# Patient Record
Sex: MALE | URBAN - METROPOLITAN AREA
[De-identification: names, ages, dates, MRNs, and addresses within clinical notes are randomized per-mention and may not be internally consistent; named-entity substitution may affect disease eponyms.]

---

## 2019-11-12 ENCOUNTER — APPOINTMENT (RX ONLY)
Dept: URBAN - METROPOLITAN AREA CLINIC 106 | Facility: CLINIC | Age: 69
Setting detail: DERMATOLOGY
End: 2019-11-12

## 2019-11-12 DIAGNOSIS — L30.9 DERMATITIS, UNSPECIFIED: ICD-10-CM

## 2019-11-12 DIAGNOSIS — L30.4 ERYTHEMA INTERTRIGO: ICD-10-CM

## 2019-11-12 DIAGNOSIS — L57.8 OTHER SKIN CHANGES DUE TO CHRONIC EXPOSURE TO NONIONIZING RADIATION: ICD-10-CM

## 2019-11-12 DIAGNOSIS — L23.9 ALLERGIC CONTACT DERMATITIS, UNSPECIFIED CAUSE: ICD-10-CM

## 2019-11-12 PROBLEM — K75.9 INFLAMMATORY LIVER DISEASE, UNSPECIFIED: Status: ACTIVE | Noted: 2019-11-12

## 2019-11-12 PROBLEM — E13.9 OTHER SPECIFIED DIABETES MELLITUS WITHOUT COMPLICATIONS: Status: ACTIVE | Noted: 2019-11-12

## 2019-11-12 PROCEDURE — ? COUNSELING

## 2019-11-12 PROCEDURE — ? DIAGNOSIS COMMENT

## 2019-11-12 PROCEDURE — 99213 OFFICE O/P EST LOW 20 MIN: CPT

## 2019-11-12 PROCEDURE — ? ADDITIONAL NOTES

## 2019-11-12 PROCEDURE — ? TREATMENT REGIMEN

## 2019-11-12 PROCEDURE — ? PRESCRIPTION

## 2019-11-12 RX ORDER — CLINDAMYCIN 1 G/10ML
GEL TOPICAL
Qty: 1 | Refills: 2 | Status: ERX | COMMUNITY
Start: 2019-11-12

## 2019-11-12 RX ORDER — PIMECROLIMUS 10 MG/G
CREAM TOPICAL
Qty: 1 | Refills: 1 | Status: ERX | COMMUNITY
Start: 2019-11-12

## 2019-11-12 RX ORDER — NYSTATIN AND TRIAMCINOLONE ACETONIDE 100000; 1 [USP'U]/G; MG/G
CREAM TOPICAL DAILY
Qty: 1 | Refills: 2 | Status: ERX | COMMUNITY
Start: 2019-11-12

## 2019-11-12 RX ADMIN — NYSTATIN AND TRIAMCINOLONE ACETONIDE: 100000; 1 CREAM TOPICAL at 20:21

## 2019-11-12 RX ADMIN — PIMECROLIMUS: 10 CREAM TOPICAL at 20:11

## 2019-11-12 RX ADMIN — CLINDAMYCIN: 1 GEL TOPICAL at 20:17

## 2019-11-12 ASSESSMENT — LOCATION DETAILED DESCRIPTION DERM
LOCATION DETAILED: LEFT SUPERIOR PARIETAL SCALP
LOCATION DETAILED: RIGHT DORSAL FOOT
LOCATION DETAILED: LEFT CENTRAL MALAR CHEEK
LOCATION DETAILED: SUPRAPUBIC SKIN
LOCATION DETAILED: LEFT DORSAL FOOT

## 2019-11-12 ASSESSMENT — LOCATION SIMPLE DESCRIPTION DERM
LOCATION SIMPLE: LEFT CHEEK
LOCATION SIMPLE: LEFT FOOT
LOCATION SIMPLE: SCALP
LOCATION SIMPLE: RIGHT FOOT
LOCATION SIMPLE: GROIN

## 2019-11-12 ASSESSMENT — LOCATION ZONE DERM
LOCATION ZONE: SCALP
LOCATION ZONE: FACE
LOCATION ZONE: FEET
LOCATION ZONE: TRUNK

## 2019-11-12 NOTE — PROCEDURE: DIAGNOSIS COMMENT
Detail Level: Simple
Comment: Secondary to CPAP machine.
Comment: Pt suspects rash secondary to CPAP mask - wears qhs

## 2019-11-12 NOTE — PROCEDURE: TREATMENT REGIMEN
Initiate Treatment: Elidel 1 % topical cream \\nDays Supply: 30\\nSig: Apply to affected areas on face twice daily.
Detail Level: Zone
Plan: Reuse 5FU/ calcipotriene mix
Initiate Treatment: nystatin-triamcinolone 100,000 unit/g-0.1 % topical cream Daily\\nDays Supply: 30\\nSig: Apply to rash in groin region once to twice daily as needed
Initiate Treatment: Clindagel 1 % topical gel, once daily \\nDays Supply: 30\\nSig: Apply to affected areas on feet daily after shower, prn rash

## 2019-11-12 NOTE — PROCEDURE: MIPS QUALITY
Quality 110: Preventive Care And Screening: Influenza Immunization: Influenza immunization was not ordered or administered, reason not given
Quality 226: Preventive Care And Screening: Tobacco Use: Screening And Cessation Intervention: Patient screened for tobacco use and is an ex/non-smoker
Detail Level: Detailed
Quality 131: Pain Assessment And Follow-Up: Pain assessment using a standardized tool is documented as negative, no follow-up plan required
Quality 111:Pneumonia Vaccination Status For Older Adults: Pneumococcal Vaccination Administered
Quality 130: Documentation Of Current Medications In The Medical Record: Current Medications Documented

## 2020-02-04 ENCOUNTER — RX ONLY (OUTPATIENT)
Age: 70
Setting detail: RX ONLY
End: 2020-02-04

## 2020-02-04 RX ORDER — PIMECROLIMUS 10 MG/G
CREAM TOPICAL
Qty: 3 | Refills: 2 | Status: ERX

## 2020-11-10 ENCOUNTER — APPOINTMENT (RX ONLY)
Dept: URBAN - METROPOLITAN AREA CLINIC 106 | Facility: CLINIC | Age: 70
Setting detail: DERMATOLOGY
End: 2020-11-10

## 2020-11-10 DIAGNOSIS — L57.0 ACTINIC KERATOSIS: ICD-10-CM

## 2020-11-10 DIAGNOSIS — L82.1 OTHER SEBORRHEIC KERATOSIS: ICD-10-CM

## 2020-11-10 DIAGNOSIS — L71.8 OTHER ROSACEA: ICD-10-CM

## 2020-11-10 DIAGNOSIS — L21.8 OTHER SEBORRHEIC DERMATITIS: ICD-10-CM

## 2020-11-10 DIAGNOSIS — L85.3 XEROSIS CUTIS: ICD-10-CM

## 2020-11-10 DIAGNOSIS — L23.9 ALLERGIC CONTACT DERMATITIS, UNSPECIFIED CAUSE: ICD-10-CM | Status: WELL CONTROLLED

## 2020-11-10 DIAGNOSIS — L82.0 INFLAMED SEBORRHEIC KERATOSIS: ICD-10-CM

## 2020-11-10 DIAGNOSIS — L30.4 ERYTHEMA INTERTRIGO: ICD-10-CM | Status: WELL CONTROLLED

## 2020-11-10 DIAGNOSIS — L81.4 OTHER MELANIN HYPERPIGMENTATION: ICD-10-CM

## 2020-11-10 DIAGNOSIS — L30.9 DERMATITIS, UNSPECIFIED: ICD-10-CM | Status: WELL CONTROLLED

## 2020-11-10 DIAGNOSIS — D22 MELANOCYTIC NEVI: ICD-10-CM

## 2020-11-10 PROBLEM — D22.5 MELANOCYTIC NEVI OF TRUNK: Status: ACTIVE | Noted: 2020-11-10

## 2020-11-10 PROCEDURE — ? LIQUID NITROGEN

## 2020-11-10 PROCEDURE — ? PRESCRIPTION

## 2020-11-10 PROCEDURE — 17003 DESTRUCT PREMALG LES 2-14: CPT | Mod: 59

## 2020-11-10 PROCEDURE — 99214 OFFICE O/P EST MOD 30 MIN: CPT | Mod: 25

## 2020-11-10 PROCEDURE — ? COUNSELING

## 2020-11-10 PROCEDURE — ? TREATMENT REGIMEN

## 2020-11-10 PROCEDURE — ? SUNSCREEN RECOMMENDATIONS

## 2020-11-10 PROCEDURE — 17110 DESTRUCTION B9 LES UP TO 14: CPT

## 2020-11-10 PROCEDURE — ? DIAGNOSIS COMMENT

## 2020-11-10 PROCEDURE — 17000 DESTRUCT PREMALG LESION: CPT | Mod: 59

## 2020-11-10 PROCEDURE — ? ADDITIONAL NOTES

## 2020-11-10 RX ORDER — METRONIDAZOLE 7.5 MG/ML
LOTION TOPICAL
Qty: 1 | Refills: 3 | Status: ERX | COMMUNITY
Start: 2020-11-10

## 2020-11-10 RX ADMIN — METRONIDAZOLE: 7.5 LOTION TOPICAL at 00:00

## 2020-11-10 ASSESSMENT — LOCATION DETAILED DESCRIPTION DERM
LOCATION DETAILED: RIGHT SCAPHA
LOCATION DETAILED: RIGHT SUPERIOR CENTRAL MALAR CHEEK
LOCATION DETAILED: LEFT EYEBROW
LOCATION DETAILED: RIGHT CENTRAL ZYGOMA
LOCATION DETAILED: SUPRAPUBIC SKIN
LOCATION DETAILED: LEFT SUPERIOR CRUS OF ANTIHELIX
LOCATION DETAILED: LEFT SUPERIOR MEDIAL UPPER BACK
LOCATION DETAILED: RIGHT SUPERIOR POSTERIOR HELIX
LOCATION DETAILED: MID-FRONTAL SCALP
LOCATION DETAILED: RIGHT DORSAL FOOT
LOCATION DETAILED: RIGHT SUPERIOR MEDIAL UPPER BACK
LOCATION DETAILED: LEFT CENTRAL ZYGOMA
LOCATION DETAILED: LEFT DORSAL FOOT
LOCATION DETAILED: LEFT CENTRAL MALAR CHEEK
LOCATION DETAILED: RIGHT ANTERIOR DISTAL THIGH

## 2020-11-10 ASSESSMENT — LOCATION SIMPLE DESCRIPTION DERM
LOCATION SIMPLE: GROIN
LOCATION SIMPLE: RIGHT FOOT
LOCATION SIMPLE: RIGHT EAR
LOCATION SIMPLE: LEFT ZYGOMA
LOCATION SIMPLE: LEFT EYEBROW
LOCATION SIMPLE: LEFT EAR
LOCATION SIMPLE: LEFT FOOT
LOCATION SIMPLE: LEFT CHEEK
LOCATION SIMPLE: RIGHT THIGH
LOCATION SIMPLE: RIGHT CHEEK
LOCATION SIMPLE: RIGHT UPPER BACK
LOCATION SIMPLE: RIGHT ZYGOMA
LOCATION SIMPLE: ANTERIOR SCALP
LOCATION SIMPLE: LEFT UPPER BACK

## 2020-11-10 ASSESSMENT — LOCATION ZONE DERM
LOCATION ZONE: FACE
LOCATION ZONE: SCALP
LOCATION ZONE: LEG
LOCATION ZONE: TRUNK
LOCATION ZONE: FEET
LOCATION ZONE: EAR

## 2020-11-10 NOTE — PROCEDURE: LIQUID NITROGEN
Number Of Freeze-Thaw Cycles: 2 freeze-thaw cycles
Post-Care Instructions: I reviewed with the patient in detail post-care instructions. Patient is to wear sunprotection, and avoid picking at any of the treated lesions. Pt may apply Vaseline to crusted or scabbing areas.
Detail Level: Detailed
Consent: The patient's verbal consent was obtained including but not limited to risks of crusting, scabbing, blistering, scarring, darker or lighter pigmentary change, recurrence, incomplete removal and infection.
Render Post-Care Instructions In Note?: yes
Render Note In Bullet Format When Appropriate: No
Duration Of Freeze Thaw-Cycle (Seconds): 3
Medical Necessity Clause: This procedure was medically necessary because the lesions that were treated were:
Post-Care Instructions: I reviewed with the patient in detail post-care instructions. Patient is to wear sunprotection, and avoid picking at any of the treated lesions. Pt may apply Vaseline or Aquaphor to crusted or scabbing areas.
Medical Necessity Information: It is in your best interest to select a reason for this procedure from the list below. All of these items fulfill various CMS LCD requirements except the new and changing color options.

## 2020-11-10 NOTE — PROCEDURE: TREATMENT REGIMEN
Otc Regimen: Selsun Blue
Continue Regimen: Elidel cream qam \\nProtopic ointment qhs to affected areas
Detail Level: Zone
Continue Regimen: Elidel cream qd-bid prn flare\\n
Continue Regimen: Clindagel 1 % topical gel, once daily \\nDays Supply: 30\\nSig: Apply to affected areas on feet daily after shower, prn rash
Continue Regimen: nystatin-triamcinolone 100,000 unit/g-0.1 % topical cream Daily\\nDays Supply: 30\\nSig: Apply to rash in groin region once to twice daily as needed
Initiate Treatment: MetroLotion 0.75 % topical Qday - bid\\nDays Supply: 30\\nSig: apply to cheeks and nose qam
Otc Regimen: Amlactin cream